# Patient Record
(demographics unavailable — no encounter records)

---

## 2024-11-20 NOTE — CONSULT LETTER
[FreeTextEntry1] : Dear Dr. ALVARO COOK ,  I had the pleasure of consulting on MILKA SAINI today.  Below is my note regarding the office visit today.  Thank you so very much for allowing me to participate in MILKA's  care.  Please don't hesitate to call me should any questions or issues arise .  Sincerely,   Yovani Howard MD, FACS, [facility] Chief, Pediatric Urology Professor of Urology and Pediatrics [facility] [employer]  President, American U[employer] Association - [place] Section Past-President, Societies for Pediatric Urology

## 2024-11-20 NOTE — CONSULT LETTER
[FreeTextEntry1] : Dear Dr. ALVARO COOK ,  I had the pleasure of consulting on MILKA SAINI today.  Below is my note regarding the office visit today.  Thank you so very much for allowing me to participate in MILKA's  care.  Please don't hesitate to call me should any questions or issues arise .  Sincerely,   Yovani Howard MD, FACS, [facility] Chief, Pediatric Urology Professor of Urology and Pediatrics [facility] [employer]  President, American U[employer] Association - [place] Section Past-President, Societies for Pediatric Urology

## 2024-11-20 NOTE — CONSULT LETTER
[FreeTextEntry1] : Dear Dr. ALVARO COOK ,  I had the pleasure of consulting on MILKA SAINI today.  Below is my note regarding the office visit today.  Thank you so very much for allowing me to participate in MILKA's  care.  Please don't hesitate to call me should any questions or issues arise .  Sincerely,   Yovani Howard MD, FACS, [facility] Chief, Pediatric Urology Professor of Urology and Pediatrics [facility] [employer]  President, American U[employer] Association - [place] Section Past-President, Societies for Pediatric Urology Him/He

## 2024-11-20 NOTE — CONSULT LETTER
[FreeTextEntry1] : Dear Dr. ALVARO COOK ,  I had the pleasure of consulting on MILKA SAINI today.  Below is my note regarding the office visit today.  Thank you so very much for allowing me to participate in MILKA's  care.  Please don't hesitate to call me should any questions or issues arise .  Sincerely,   Yovnai Howard MD, FACS, [facility] Chief, Pediatric Urology Professor of Urology and Pediatrics [facility] [employer]  President, American U[employer] Association - [place] Section Past-President, Societies for Pediatric Urology

## 2024-11-20 NOTE — HISTORY OF PRESENT ILLNESS
[TextBox_4] : MILKA is here today for evaluation.  [employer]  ID# [id number] ([place]) utilized as per parent request.  He is a healthy 12 year old child who was never circumcised. The prepuce has never retracted well. He has not had any infections but does have progressive ballooning of the prepuce with urination. He has had discomfort with urination at times.

## 2024-11-20 NOTE — ASSESSMENT
[FreeTextEntry1] : MILKA has phimosis.  We discussed the condition and its implications and then the management options, including monitoring, use of medication, and circumcision. The risks and benefits and possible complications of each option (including but not limited to reaction to steroids, bleeding, injury, incomplete circumcision and need for additional injury) was discussed and all questions were answered.  The decision for circumcision was made.  Due to his age, the circumcision will be performed in the operating room under anesthesia.   
No

## 2024-11-20 NOTE — REASON FOR VISIT
[Initial Consultation] : an initial consultation [Phimosis] : phimosis [Parents] : parents [PCP] : ~pcp~

## 2024-11-20 NOTE — HISTORY OF PRESENT ILLNESS
[TextBox_4] : MILKA is here today for evaluation.  [employer]  ID# [id number] ([place]) utilized as per parent request.  He is a healthy 12 year old child who was never circumcised. The prepuce has never retracted well. He has not had any infections but does have progressive ballooning of the prepuce with urination. He has had discomfort with urination at times.

## 2024-11-20 NOTE — PHYSICAL EXAM
[TextBox_92] :  PENIS: Straight uncircumcised penis with phimosis.  Meatus not visible.   SCROTUM: Bilaterally symmetric testes in dependent position without palpable mass, hernia, hydrocele or varicocele

## 2024-11-20 NOTE — HISTORY OF PRESENT ILLNESS
[TextBox_4] : MILKA is here today for evaluation.  [employer]  ID# [id number] ([place]) utilized as per parent request.  He is a healthy 12 year old child who was never circumcised. The prepuce has never retracted well. He has not had any infections but does have progressive ballooning of the prepuce with urination. He has had discomfort with urination at times.

## 2024-11-20 NOTE — HISTORY OF PRESENT ILLNESS
[TextBox_4] : MILKA is here today for evaluation.  [employer]  ID# [id number] ([place]) utilized as per parent request.  He is a healthy 12 year old child who was never circumcised. The prepuce has never retracted well. He has not had any infections but does have progressive ballooning of the prepuce with urination. He has had discomfort with urination at times.

## 2025-06-30 NOTE — PROCEDURE
[FreeTextEntry3] : CIRCUMCISION VPLASTY [FreeTextEntry6] : FOLLOW UP 3 WEEKS [FreeTextEntry5] : NONE

## 2025-06-30 NOTE — CONSULT LETTER
[FreeTextEntry1] :   Dear Dr. ALVARO COOK,   Our mutual patient, MILKA BOLTON underwent surgery today as outlined below. The procedure went well and he was discharged from the PACU after an uneventful stay. Discharge instructions were provided in writing. Instructions regarding follow up were also provided.   Sincerely,   Yovani Howard MD, FACS, [facility] Chief, Pediatric Urology Professor of Urology and Pediatrics [facility] [employer] [facility] at [facility].   President, American U[employer] Association   OR